# Patient Record
Sex: MALE | Race: WHITE | NOT HISPANIC OR LATINO | URBAN - METROPOLITAN AREA
[De-identification: names, ages, dates, MRNs, and addresses within clinical notes are randomized per-mention and may not be internally consistent; named-entity substitution may affect disease eponyms.]

---
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
## 2021-04-17 NOTE — ED BEHAVIORAL HEALTH ASSESSMENT NOTE - AFFECT RANGE
Patient daughter transferred patient into bathroom when PCA was doing hourly rounding she asked how she got in bathroom and daughter sated she is an occupational therapist and is capable of transferring her mother  PCA asked daughter is she would ring bell when mother was done so staff could assist with her transfer out of bathroom and daughter was agreeable  PCA then went back to check on patient and daughter had transferred patient again back into her chair and was at the sink washing her hands  PCA again asked if daughter transferred patient without calling and daughter stated yes  PCA reported incident to this nurse and made therapist aware who also spoke with her and explained the liability if she or her mother were to get injured during a transfer  Daughter was agreeable to call staff for assistance and not transfer without staff involvement 
Patient's daughter here to do lovenox injection she did very well with no real cueing
Pt is alert and oriented, pleasant and cooperative  RN checked skin on sacrum, very tiny scab present on L buttock, no pink area  Pt repositioned with pillow support onto her R side, hydraguard applied  Pt refused her colace this evening, she did report having 2 BM today  Pt has call bell in reach, she makes needs known 
Pt is alert and oriented, she is pleasant and cooperative with care  Oxy 2 5 mg administered for 6/10 pain in R knee  Left arm splint feel looser per pt, and has 2/10 pain- ortho is aware per MD and should be seeing the pt today or tomorrow  Pt in bed with pillow support, LUE and RLE are elevated  Call bell is in reach 
Pt refused lovenox injection teaching, pt not sure if kirstieer will be able to administer lovenox  Pt is inquiring if some other medication may be used in place of lovenox   RN will inform medical 
Went over appointments and over medications with patient will be going home with her daughter
Constricted

## 2021-10-29 NOTE — ED BEHAVIORAL HEALTH ASSESSMENT NOTE - NS ED [facility] MED ROS HEMATOLOGIC LYMPHATIC
Problem: Falls - Risk of:  Goal: Will remain free from falls  Description: Will remain free from falls  10/29/2021 1743 by Pricilla Sanchez RN  Outcome: Ongoing  10/29/2021 0534 by Kadi Montiel RN  Outcome: Ongoing  Goal: Absence of physical injury  Description: Absence of physical injury  10/29/2021 1743 by Pricilla Snachez RN  Outcome: Ongoing  10/29/2021 0534 by Kadi Montiel RN  Outcome: Ongoing     Problem: Discharge Planning:  Goal: Discharged to appropriate level of care  Description: Discharged to appropriate level of care  10/29/2021 1743 by Pricilla Sanchez RN  Outcome: Ongoing  10/29/2021 0534 by Kadi Montiel RN  Outcome: Ongoing     Problem:  Body Temperature - Imbalanced:  Goal: Ability to maintain a body temperature in the normal range will improve  Description: Ability to maintain a body temperature in the normal range will improve  10/29/2021 1743 by Pricilla Sanchez RN  Outcome: Ongoing  10/29/2021 0534 by Kadi Montiel RN  Outcome: Ongoing     Problem: Mobility - Impaired:  Goal: Mobility will improve to maximum level  Description: Mobility will improve to maximum level  10/29/2021 1743 by Pricilla Sanchez RN  Outcome: Ongoing  10/29/2021 0534 by Kadi Montiel RN  Outcome: Ongoing     Problem: Pain:  Goal: Pain level will decrease  Description: Pain level will decrease  10/29/2021 1743 by Pricilla Sanchez RN  Outcome: Ongoing  10/29/2021 0534 by Kadi Motniel RN  Outcome: Ongoing  Goal: Control of acute pain  Description: Control of acute pain  10/29/2021 1743 by Pricilla Sanchez RN  Outcome: Ongoing  10/29/2021 0534 by Kadi Montiel RN  Outcome: Ongoing  Goal: Control of chronic pain  Description: Control of chronic pain  10/29/2021 1743 by Pricilla Sanchez RN  Outcome: Ongoing  10/29/2021 0534 by Kadi Montiel RN  Outcome: Ongoing     Problem: Skin Integrity - Impaired:  Goal: Will show no infection signs and symptoms  Description: Will show no infection signs and symptoms  10/29/2021 1743 by Lindsey Rubi RN  Outcome: Ongoing  10/29/2021 0534 by Felipe Espinoza RN  Outcome: Ongoing  Goal: Absence of new skin breakdown  Description: Absence of new skin breakdown  10/29/2021 1743 by Lindsey Rubi RN  Outcome: Ongoing  10/29/2021 0534 by Felipe Espinoza RN  Outcome: Ongoing     Problem: Serum Glucose Level - Abnormal:  Goal: Ability to maintain appropriate glucose levels will improve  Description: Ability to maintain appropriate glucose levels will improve  10/29/2021 1743 by Lindsey Rubi RN  Outcome: Ongoing  10/29/2021 0534 by Felipe Espinoza RN  Outcome: Ongoing     Problem: Sensory Perception - Impaired:  Goal: Ability to maintain a stable neurologic state will improve  Description: Ability to maintain a stable neurologic state will improve  10/29/2021 1743 by Lindsey Rubi RN  Outcome: Ongoing  10/29/2021 0534 by Felipe Espinoza RN  Outcome: Ongoing     Problem:  Activity:  Goal: Ability to tolerate increased activity will improve  Description: Ability to tolerate increased activity will improve  10/29/2021 1743 by Lindsey Rubi RN  Outcome: Ongoing  10/29/2021 0534 by Felipe Espinoza RN  Outcome: Ongoing  Goal: Risk for activity intolerance will decrease  Description: Risk for activity intolerance will decrease  10/29/2021 1743 by Lindsey Rubi RN  Outcome: Ongoing  10/29/2021 0534 by Felipe Espinoza RN  Outcome: Ongoing  Goal: Expression of feelings of increased energy will increase  Description: Expression of feelings of increased energy will increase  10/29/2021 1743 by Lindsey Rubi RN  Outcome: Ongoing  10/29/2021 0534 by Felipe Espinoza RN  Outcome: Ongoing  Goal: Amount of time patient spends in regular exercise will increase  10/29/2021 1743 by Lindsey Rubi RN  Outcome: Ongoing  10/29/2021 0534 by Felipe Espinoza RN  Outcome: Ongoing     Problem: Cardiac:  Goal: Hemodynamic stability will improve  Description: Hemodynamic stability will improve  10/29/2021 1743 by Sid Beth RN  Outcome: Ongoing  10/29/2021 0534 by Kuldip Gutierrez RN  Outcome: Ongoing  Goal: Complications related to the disease process, condition or treatment will be avoided or minimized  Description: Complications related to the disease process, condition or treatment will be avoided or minimized  10/29/2021 1743 by Sid Beth RN  Outcome: Ongoing  10/29/2021 0534 by Kuldip Gutierrez RN  Outcome: Ongoing  Goal: Cerebral tissue perfusion will improve  Description: Cerebral tissue perfusion will improve  10/29/2021 1743 by Sid Beth RN  Outcome: Ongoing  10/29/2021 0534 by Kuldip Gutierrez RN  Outcome: Ongoing  Goal: Ability to maintain an adequate cardiac output will improve  Description: Ability to maintain an adequate cardiac output will improve  10/29/2021 1743 by Sid Beth RN  Outcome: Ongoing  10/29/2021 0534 by Kuldip Gutierrez RN  Outcome: Ongoing     Problem: Coping:  Goal: Ability to identify and develop effective coping behavior will improve  Description: Ability to identify and develop effective coping behavior will improve  10/29/2021 1743 by Sid Beth RN  Outcome: Ongoing  10/29/2021 0534 by Kuldip Gutierrez RN  Outcome: Ongoing  Goal: Ability to adjust to condition or change in health will improve  Description: Ability to adjust to condition or change in health will improve  10/29/2021 1743 by Sid Beth RN  Outcome: Ongoing  10/29/2021 0534 by Kuldip Gutierrez RN  Outcome: Ongoing  Goal: Level of anxiety will decrease  Description: Level of anxiety will decrease  10/29/2021 1743 by Sid Beth RN  Outcome: Ongoing  10/29/2021 0534 by Kuldip Gutierrez RN  Outcome: Ongoing  Goal: General experience of comfort will improve  10/29/2021 1743 by Sid Beth RN  Outcome: Ongoing  10/29/2021 0534 by Kuldip Gutierrez RN  Outcome: Ongoing     Problem: Health Behavior:  Goal: Identification of resources available to assist in meeting health care needs will improve  Description: Identification of resources available to assist in meeting health care needs will improve  10/29/2021 1743 by Keegan Arambula RN  Outcome: Ongoing  10/29/2021 0534 by Ari Solorzano RN  Outcome: Ongoing  Goal: Ability to identify and utilize available resources and services will improve  Description: Ability to identify and utilize available resources and services will improve  10/29/2021 1743 by Keegan Arambula RN  Outcome: Ongoing  10/29/2021 0534 by Ari Solorzano RN  Outcome: Ongoing  Goal: Ability to manage health-related needs will improve  Description: Ability to manage health-related needs will improve  10/29/2021 1743 by Keegan Arambula RN  Outcome: Ongoing  10/29/2021 0534 by Ari Solorzano RN  Outcome: Ongoing     Problem: Nutritional:  Goal: Ability to identify appropriate dietary choices will improve  Description: Ability to identify appropriate dietary choices will improve  10/29/2021 1743 by Keegan Arambula RN  Outcome: Ongoing  10/29/2021 0534 by Ari Solorzano RN  Outcome: Ongoing  Goal: Maintenance of adequate nutrition will improve  Description: Maintenance of adequate nutrition will improve  10/29/2021 1743 by Keegan Arambula RN  Outcome: Ongoing  10/29/2021 0534 by Ari Solorzano RN  Outcome: Ongoing  Goal: Progress toward achieving an optimal weight will improve  Description: Progress toward achieving an optimal weight will improve  10/29/2021 1743 by Keegan Arambula RN  Outcome: Ongoing  10/29/2021 0534 by Ari Solorzano RN  Outcome: Ongoing  Goal: Adjustment of eating patterns to appropriate times will improve  Description: Adjustment of eating patterns to appropriate times will improve  10/29/2021 1743 by Keegan Arambula RN  Outcome: Ongoing  10/29/2021 0534 by Ari Solorzano RN  Outcome: Ongoing  Goal: Ability to make healthy dietary choices will improve  Description: Ability to make healthy dietary choices will improve  10/29/2021 [id number] by Celine Lazar RN  Outcome: Ongoing  10/29/2021 0534 by Sarahi Rae RN  Outcome: Ongoing  Goal: Nutritional status will improve  10/29/2021 1743 by Celine Lazar RN  Outcome: Ongoing  10/29/2021 0534 by Sarahi Rae RN  Outcome: Ongoing     Problem: ABCDS Injury Assessment  Goal: Absence of physical injury  10/29/2021 1743 by Celine Lazar RN  Outcome: Ongoing  10/29/2021 0534 by Sarahi Rae RN  Outcome: Ongoing     Problem: Fluid Volume:  Goal: Ability to maintain a balanced intake and output will improve  Description: Ability to maintain a balanced intake and output will improve  10/29/2021 1743 by Celine Lazar RN  Outcome: Ongoing  10/29/2021 0534 by Sarahi Rae RN  Outcome: Ongoing     Problem: Metabolic:  Goal: Ability to maintain appropriate glucose levels will improve  Description: Ability to maintain appropriate glucose levels will improve  10/29/2021 1743 by Celine Lazar RN  Outcome: Ongoing  10/29/2021 0534 by Sarahi Rae RN  Outcome: Ongoing     Problem: Physical Regulation:  Goal: Complications related to the disease process, condition or treatment will be avoided or minimized  Description: Complications related to the disease process, condition or treatment will be avoided or minimized  10/29/2021 1743 by Celine Lazar RN  Outcome: Ongoing  10/29/2021 0534 by Sarahi Rae RN  Outcome: Ongoing  Goal: Diagnostic test results will improve  Description: Diagnostic test results will improve  10/29/2021 1743 by Celine Lazar RN  Outcome: Ongoing  10/29/2021 0534 by Sarahi Rae RN  Outcome: Ongoing  Goal: Prevent transmision of infection  Description: Prevent transmision of infection  10/29/2021 1743 by Celine Lazar RN  Outcome: Ongoing  10/29/2021 0534 by Sarahi Rae RN  Outcome: Ongoing  Goal: Will remain free from infection  Description: Will remain free from infection  10/29/2021 1743 by Celine Lazar RN  Outcome: Ongoing  10/29/2021 0534 by Hermes Mi RN  Outcome: Ongoing  Goal: Ability to maintain vital signs within normal range will improve  Description: Ability to maintain vital signs within normal range will improve  10/29/2021 1743 by Parminder Nugent RN  Outcome: Ongoing  10/29/2021 0534 by Hermes Mi RN  Outcome: Ongoing     Problem: Skin Integrity:  Goal: Risk for impaired skin integrity will decrease  Description: Risk for impaired skin integrity will decrease  10/29/2021 1743 by Parminder Nugent RN  Outcome: Ongoing  10/29/2021 0534 by Hermes Mi RN  Outcome: Ongoing  Goal: Demonstration of wound healing without infection will improve  Description: Demonstration of wound healing without infection will improve  10/29/2021 1743 by Parminder Nugent RN  Outcome: Ongoing  10/29/2021 0534 by Hermes Mi RN  Outcome: Ongoing  Goal: Complications related to intravenous access or infusion will be avoided or minimized  Description: Complications related to intravenous access or infusion will be avoided or minimized  10/29/2021 1743 by Parminder Nugent RN  Outcome: Ongoing  10/29/2021 0534 by Hermes Mi RN  Outcome: Ongoing     Problem: Tissue Perfusion:  Goal: Adequacy of tissue perfusion will improve  Description: Adequacy of tissue perfusion will improve  10/29/2021 1743 by Parminder Nugent RN  Outcome: Ongoing  10/29/2021 0534 by Hermes Mi RN  Outcome: Ongoing     Problem: Safety:  Goal: Ability to remain free from injury will improve  Description: Ability to remain free from injury will improve  Outcome: Ongoing  Goal: Will show no signs and symptoms of excessive bleeding  Description: Will show no signs and symptoms of excessive bleeding  Outcome: Ongoing     Problem: Infection - Methicillin-Resistant Staphylococcus Aureus Infection:  Goal: Absence of methicillin-resistant Staphylococcus aureus infection  Description: Absence of methicillin-resistant Staphylococcus aureus infection  Outcome: Ongoing     Problem: Self-Concept:  Goal: Ability to verbalize positive feelings about self will improve  Description: Ability to verbalize positive feelings about self will improve  Outcome: Ongoing     Problem: Respiratory:  Goal: Ability to maintain normal respiratory secretions will improve  Description: Ability to maintain normal respiratory secretions will improve  Outcome: Ongoing     Problem: SAFETY  Goal: Free from accidental physical injury  Outcome: Ongoing  Goal: Free from intentional harm  Outcome: Ongoing     Problem: DAILY CARE  Goal: Daily care needs are met  Outcome: Ongoing     Problem: PAIN  Goal: Patient's pain/discomfort is manageable  Outcome: Ongoing     Problem: SKIN INTEGRITY  Goal: Skin integrity is maintained or improved  Outcome: Ongoing     Problem: KNOWLEDGE DEFICIT  Goal: Patient/S.O. demonstrates understanding of disease process, treatment plan, medications, and discharge instructions.   Outcome: Ongoing     Problem: DISCHARGE BARRIERS  Goal: Patient's continuum of care needs are met  Outcome: Ongoing     Problem: Bleeding:  Goal: Will show no signs and symptoms of excessive bleeding  Description: Will show no signs and symptoms of excessive bleeding  Outcome: Ongoing No complaints

## 2022-06-04 ENCOUNTER — EMERGENCY (EMERGENCY)
Facility: HOSPITAL | Age: 22
LOS: 0 days | Discharge: HOME | End: 2022-06-04
Attending: FAMILY MEDICINE
Payer: COMMERCIAL

## 2022-06-04 VITALS
HEIGHT: 72 IN | DIASTOLIC BLOOD PRESSURE: 94 MMHG | OXYGEN SATURATION: 100 % | WEIGHT: 160.06 LBS | SYSTOLIC BLOOD PRESSURE: 145 MMHG | TEMPERATURE: 98 F | RESPIRATION RATE: 19 BRPM | HEART RATE: 88 BPM

## 2022-06-04 VITALS
OXYGEN SATURATION: 100 % | SYSTOLIC BLOOD PRESSURE: 152 MMHG | HEART RATE: 58 BPM | RESPIRATION RATE: 17 BRPM | TEMPERATURE: 98 F | DIASTOLIC BLOOD PRESSURE: 96 MMHG

## 2022-06-04 DIAGNOSIS — F41.9 ANXIETY DISORDER, UNSPECIFIED: ICD-10-CM

## 2022-06-04 DIAGNOSIS — K20.90 ESOPHAGITIS, UNSPECIFIED WITHOUT BLEEDING: ICD-10-CM

## 2022-06-04 DIAGNOSIS — E87.6 HYPOKALEMIA: ICD-10-CM

## 2022-06-04 DIAGNOSIS — R11.2 NAUSEA WITH VOMITING, UNSPECIFIED: ICD-10-CM

## 2022-06-04 DIAGNOSIS — R00.1 BRADYCARDIA, UNSPECIFIED: ICD-10-CM

## 2022-06-04 DIAGNOSIS — Z88.1 ALLERGY STATUS TO OTHER ANTIBIOTIC AGENTS STATUS: ICD-10-CM

## 2022-06-04 DIAGNOSIS — F32.A DEPRESSION, UNSPECIFIED: ICD-10-CM

## 2022-06-04 DIAGNOSIS — K59.00 CONSTIPATION, UNSPECIFIED: ICD-10-CM

## 2022-06-04 LAB
ADD ON TEST-SPECIMEN IN LAB: SIGNIFICANT CHANGE UP
ALBUMIN SERPL ELPH-MCNC: 4.6 G/DL — SIGNIFICANT CHANGE UP (ref 3.3–5)
ALP SERPL-CCNC: 86 U/L — SIGNIFICANT CHANGE UP (ref 40–120)
ALT FLD-CCNC: 26 U/L — SIGNIFICANT CHANGE UP (ref 12–78)
ANION GAP SERPL CALC-SCNC: 9 MMOL/L — SIGNIFICANT CHANGE UP (ref 5–17)
APPEARANCE UR: CLEAR — SIGNIFICANT CHANGE UP
APTT BLD: 29.3 SEC — SIGNIFICANT CHANGE UP (ref 27.5–35.5)
AST SERPL-CCNC: 14 U/L — LOW (ref 15–37)
BASOPHILS # BLD AUTO: 0.04 K/UL — SIGNIFICANT CHANGE UP (ref 0–0.2)
BASOPHILS NFR BLD AUTO: 0.5 % — SIGNIFICANT CHANGE UP (ref 0–2)
BILIRUB SERPL-MCNC: 1.8 MG/DL — HIGH (ref 0.2–1.2)
BILIRUB UR-MCNC: NEGATIVE — SIGNIFICANT CHANGE UP
BUN SERPL-MCNC: 29 MG/DL — HIGH (ref 7–23)
CALCIUM SERPL-MCNC: 10.5 MG/DL — HIGH (ref 8.5–10.1)
CHLORIDE SERPL-SCNC: 101 MMOL/L — SIGNIFICANT CHANGE UP (ref 96–108)
CO2 SERPL-SCNC: 25 MMOL/L — SIGNIFICANT CHANGE UP (ref 22–31)
COLOR SPEC: YELLOW — SIGNIFICANT CHANGE UP
CREAT SERPL-MCNC: 1.15 MG/DL — SIGNIFICANT CHANGE UP (ref 0.5–1.3)
DIFF PNL FLD: NEGATIVE — SIGNIFICANT CHANGE UP
EGFR: 93 ML/MIN/1.73M2 — SIGNIFICANT CHANGE UP
EOSINOPHIL # BLD AUTO: 0.05 K/UL — SIGNIFICANT CHANGE UP (ref 0–0.5)
EOSINOPHIL NFR BLD AUTO: 0.6 % — SIGNIFICANT CHANGE UP (ref 0–6)
GLUCOSE SERPL-MCNC: 125 MG/DL — HIGH (ref 70–99)
GLUCOSE UR QL: NEGATIVE — SIGNIFICANT CHANGE UP
HCT VFR BLD CALC: 48.4 % — SIGNIFICANT CHANGE UP (ref 39–50)
HGB BLD-MCNC: 17 G/DL — SIGNIFICANT CHANGE UP (ref 13–17)
IMM GRANULOCYTES NFR BLD AUTO: 0.4 % — SIGNIFICANT CHANGE UP (ref 0–1.5)
INR BLD: 1.22 RATIO — HIGH (ref 0.88–1.16)
KETONES UR-MCNC: ABNORMAL
LACTATE SERPL-SCNC: 2.1 MMOL/L — HIGH (ref 0.7–2)
LEUKOCYTE ESTERASE UR-ACNC: NEGATIVE — SIGNIFICANT CHANGE UP
LIDOCAIN IGE QN: 125 U/L — SIGNIFICANT CHANGE UP (ref 73–393)
LYMPHOCYTES # BLD AUTO: 1.67 K/UL — SIGNIFICANT CHANGE UP (ref 1–3.3)
LYMPHOCYTES # BLD AUTO: 21.1 % — SIGNIFICANT CHANGE UP (ref 13–44)
MCHC RBC-ENTMCNC: 30 PG — SIGNIFICANT CHANGE UP (ref 27–34)
MCHC RBC-ENTMCNC: 35.1 GM/DL — SIGNIFICANT CHANGE UP (ref 32–36)
MCV RBC AUTO: 85.5 FL — SIGNIFICANT CHANGE UP (ref 80–100)
MONOCYTES # BLD AUTO: 0.94 K/UL — HIGH (ref 0–0.9)
MONOCYTES NFR BLD AUTO: 11.9 % — SIGNIFICANT CHANGE UP (ref 2–14)
NEUTROPHILS # BLD AUTO: 5.2 K/UL — SIGNIFICANT CHANGE UP (ref 1.8–7.4)
NEUTROPHILS NFR BLD AUTO: 65.5 % — SIGNIFICANT CHANGE UP (ref 43–77)
NITRITE UR-MCNC: NEGATIVE — SIGNIFICANT CHANGE UP
PH UR: 7 — SIGNIFICANT CHANGE UP (ref 5–8)
PLATELET # BLD AUTO: 347 K/UL — SIGNIFICANT CHANGE UP (ref 150–400)
POTASSIUM SERPL-MCNC: 2.8 MMOL/L — CRITICAL LOW (ref 3.5–5.3)
POTASSIUM SERPL-SCNC: 2.8 MMOL/L — CRITICAL LOW (ref 3.5–5.3)
PROT SERPL-MCNC: 8.2 GM/DL — SIGNIFICANT CHANGE UP (ref 6–8.3)
PROT UR-MCNC: NEGATIVE — SIGNIFICANT CHANGE UP
PROTHROM AB SERPL-ACNC: 14.2 SEC — HIGH (ref 10.5–13.4)
RAPID RVP RESULT: SIGNIFICANT CHANGE UP
RBC # BLD: 5.66 M/UL — SIGNIFICANT CHANGE UP (ref 4.2–5.8)
RBC # FLD: 12.3 % — SIGNIFICANT CHANGE UP (ref 10.3–14.5)
SARS-COV-2 RNA SPEC QL NAA+PROBE: SIGNIFICANT CHANGE UP
SODIUM SERPL-SCNC: 135 MMOL/L — SIGNIFICANT CHANGE UP (ref 135–145)
SP GR SPEC: 1 — LOW (ref 1.01–1.02)
UROBILINOGEN FLD QL: 1
WBC # BLD: 7.93 K/UL — SIGNIFICANT CHANGE UP (ref 3.8–10.5)
WBC # FLD AUTO: 7.93 K/UL — SIGNIFICANT CHANGE UP (ref 3.8–10.5)

## 2022-06-04 PROCEDURE — 85610 PROTHROMBIN TIME: CPT

## 2022-06-04 PROCEDURE — C9113: CPT

## 2022-06-04 PROCEDURE — 93010 ELECTROCARDIOGRAM REPORT: CPT | Mod: 59

## 2022-06-04 PROCEDURE — 83735 ASSAY OF MAGNESIUM: CPT

## 2022-06-04 PROCEDURE — 85730 THROMBOPLASTIN TIME PARTIAL: CPT

## 2022-06-04 PROCEDURE — 86850 RBC ANTIBODY SCREEN: CPT

## 2022-06-04 PROCEDURE — 96375 TX/PRO/DX INJ NEW DRUG ADDON: CPT

## 2022-06-04 PROCEDURE — 81003 URINALYSIS AUTO W/O SCOPE: CPT

## 2022-06-04 PROCEDURE — 0225U NFCT DS DNA&RNA 21 SARSCOV2: CPT

## 2022-06-04 PROCEDURE — 86901 BLOOD TYPING SEROLOGIC RH(D): CPT

## 2022-06-04 PROCEDURE — 99284 EMERGENCY DEPT VISIT MOD MDM: CPT

## 2022-06-04 PROCEDURE — 93005 ELECTROCARDIOGRAM TRACING: CPT

## 2022-06-04 PROCEDURE — 74177 CT ABD & PELVIS W/CONTRAST: CPT | Mod: 26,MA

## 2022-06-04 PROCEDURE — 99285 EMERGENCY DEPT VISIT HI MDM: CPT | Mod: 25

## 2022-06-04 PROCEDURE — 74177 CT ABD & PELVIS W/CONTRAST: CPT | Mod: MA

## 2022-06-04 PROCEDURE — 83605 ASSAY OF LACTIC ACID: CPT

## 2022-06-04 PROCEDURE — 99285 EMERGENCY DEPT VISIT HI MDM: CPT

## 2022-06-04 PROCEDURE — 36415 COLL VENOUS BLD VENIPUNCTURE: CPT

## 2022-06-04 PROCEDURE — 96376 TX/PRO/DX INJ SAME DRUG ADON: CPT

## 2022-06-04 PROCEDURE — 96374 THER/PROPH/DIAG INJ IV PUSH: CPT

## 2022-06-04 PROCEDURE — 85025 COMPLETE CBC W/AUTO DIFF WBC: CPT

## 2022-06-04 PROCEDURE — 83690 ASSAY OF LIPASE: CPT

## 2022-06-04 PROCEDURE — 80053 COMPREHEN METABOLIC PANEL: CPT

## 2022-06-04 PROCEDURE — 86900 BLOOD TYPING SEROLOGIC ABO: CPT

## 2022-06-04 RX ORDER — PANTOPRAZOLE SODIUM 20 MG/1
1 TABLET, DELAYED RELEASE ORAL
Qty: 14 | Refills: 0
Start: 2022-06-04 | End: 2022-06-17

## 2022-06-04 RX ORDER — POTASSIUM CHLORIDE 20 MEQ
10 PACKET (EA) ORAL
Refills: 0 | Status: COMPLETED | OUTPATIENT
Start: 2022-06-04 | End: 2022-06-04

## 2022-06-04 RX ORDER — PANTOPRAZOLE SODIUM 20 MG/1
40 TABLET, DELAYED RELEASE ORAL ONCE
Refills: 0 | Status: COMPLETED | OUTPATIENT
Start: 2022-06-04 | End: 2022-06-04

## 2022-06-04 RX ORDER — SODIUM CHLORIDE 9 MG/ML
1000 INJECTION INTRAMUSCULAR; INTRAVENOUS; SUBCUTANEOUS ONCE
Refills: 0 | Status: COMPLETED | OUTPATIENT
Start: 2022-06-04 | End: 2022-06-04

## 2022-06-04 RX ORDER — ONDANSETRON 8 MG/1
4 TABLET, FILM COATED ORAL ONCE
Refills: 0 | Status: COMPLETED | OUTPATIENT
Start: 2022-06-04 | End: 2022-06-04

## 2022-06-04 RX ORDER — ONDANSETRON 8 MG/1
1 TABLET, FILM COATED ORAL
Qty: 15 | Refills: 0
Start: 2022-06-04 | End: 2022-06-08

## 2022-06-04 RX ADMIN — SODIUM CHLORIDE 1000 MILLILITER(S): 9 INJECTION INTRAMUSCULAR; INTRAVENOUS; SUBCUTANEOUS at 01:59

## 2022-06-04 RX ADMIN — ONDANSETRON 4 MILLIGRAM(S): 8 TABLET, FILM COATED ORAL at 01:08

## 2022-06-04 RX ADMIN — ONDANSETRON 4 MILLIGRAM(S): 8 TABLET, FILM COATED ORAL at 04:34

## 2022-06-04 RX ADMIN — PANTOPRAZOLE SODIUM 40 MILLIGRAM(S): 20 TABLET, DELAYED RELEASE ORAL at 03:07

## 2022-06-04 RX ADMIN — SODIUM CHLORIDE 1000 MILLILITER(S): 9 INJECTION INTRAMUSCULAR; INTRAVENOUS; SUBCUTANEOUS at 01:05

## 2022-06-04 RX ADMIN — Medication 100 MILLIEQUIVALENT(S): at 03:09

## 2022-06-04 RX ADMIN — Medication 100 MILLIEQUIVALENT(S): at 04:08

## 2022-06-04 RX ADMIN — Medication 100 MILLIEQUIVALENT(S): at 02:01

## 2022-06-04 NOTE — ED PROVIDER NOTE - CARE PROVIDER_API CALL
Gisela Waldron  GASTROENTEROLOGY  [address]  [place] [address]  Phone: [phone]  Fax: [phone]  Follow Up Time:

## 2022-06-04 NOTE — ED PROVIDER NOTE - PATIENT PORTAL LINK FT
You can access the FollowMyHealth Patient Portal offered by [facility] by registering at the following website: [email or web address]. By joining [facility]’s FollowMyHealth portal, you will also be able to view your health information using other applications (apps) compatible with our system.

## 2022-06-04 NOTE — ED PROVIDER NOTE - CARE PLAN
1 Principal Discharge DX:	Vomiting  Secondary Diagnosis:	Constipation  Secondary Diagnosis:	Hypokalemia   Principal Discharge DX:	Esophagitis  Secondary Diagnosis:	Constipation  Secondary Diagnosis:	Hypokalemia

## 2022-06-04 NOTE — ED ADULT NURSE NOTE - OBJECTIVE STATEMENT
Pt presents to the ED with N/V x 5 days. States hx of ED, acid reflux, anxiety. Takes Prozac daily. Endorsing anxiety at this time, believes it is making symptoms worse. AAOx3. Respirations even and unlabored, NAD. Skin warm, dry, color appropriate. No active vomiting.

## 2022-06-04 NOTE — ED PROVIDER NOTE - CLINICAL SUMMARY MEDICAL DECISION MAKING FREE TEXT BOX
Pt with hx anxiety, depression and eating disorder, here with vomiting for six days with no bm. No fever. Labs, ivf, ct abd pelvis zofran.

## 2022-06-04 NOTE — ED PROVIDER NOTE - NSFOLLOWUPINSTRUCTIONS_ED_ALL_ED_FT
Take protonix 40mg daily. Avoid NSAIDS, caffeine, spicy foods, tomato sauce. Avoid late meals Follow up with GI Dr. Waldron.    Esophagitis       Esophagitis is inflammation of the esophagus. The esophagus is the tube that carries food from the mouth to the stomach. Esophagitis can cause soreness or pain in the esophagus. This condition can make it difficult and painful to swallow.      What are the causes?    Most causes of esophagitis are not serious. Common causes of this condition include:  •Gastroesophageal reflux disease (GERD). This is when stomach contents move back up into the esophagus (reflux).      •Repeated vomiting.      •An allergic reaction, especially caused by food allergies (eosinophilic esophagitis).      •Injury to the esophagus by swallowing large pills with or without water, or swallowing certain types of medicines.      •Swallowing harmful chemicals, such as household cleaning products.      •Drinking a lot of alcohol.      •An infection of the esophagus. This most often occurs in people who have a weakened immune system.      •Radiation or chemotherapy treatment for cancer.      •Certain diseases such as sarcoidosis, Crohn's disease, and scleroderma.        What are the signs or symptoms?    Symptoms of this condition include:  •Difficult or painful swallowing.      •Pain with swallowing acidic liquids, such as citrus juices. You may also have pain when you burp.      •Chest pain and difficulty breathing.      •Nausea and vomiting.      •Pain in the abdomen.      •Weight loss.      •Ulcers in the mouth and white patches in the mouth (candidiasis).      •Fever.      •Coughing up blood or vomiting blood.      •Stool that is black, tarry, or bright red.        How is this diagnosed?    This condition may be diagnosed based on your medical history and a physical exam. You may also have other tests, including:  •A test to examine your esophagus and stomach with a small flexible tube with a camera (endoscopy).      •A test that measures the acidity level in your esophagus.      •A test that measures how much pressure is on your esophagus.      •A barium swallow or modified barium swallow to show the shape, size, and functioning of your esophagus.      •Allergy tests.        How is this treated?    Treatment for this condition depends on the cause of your esophagitis. In some cases, steroids or other medicines may be given to help relieve your symptoms or to treat the underlying cause of your condition. You may have to make some lifestyle changes, such as:  •Avoiding alcohol.      •Quitting any products that contain nicotine or tobacco. These products include cigarettes, chewing tobacco, and vaping devices, such as e-cigarettes. If you need help quitting, ask your health care provider.      •Changing your diet.      •Exercising.      •Changing your sleep habits and your sleep environment.        Follow these instructions at home:    Medicines     •Take over-the-counter and prescription medicines only as told by your health care provider.       • Do not take aspirin, ibuprofen, or other NSAIDs unless your health care provider told you to do so.    •If you have trouble taking pills:  •Use a pill splitter to decrease the size of the pill. This will decrease the chance of the pill getting stuck or injuring your esophagus.       •Drink water after you take a pill.          Eating and drinking      •Avoid foods and drinks that seem to make your symptoms worse.    •Follow a diet as recommended by your health care provider. This may involve avoiding foods and drinks such as:  •Coffee and tea, with or without caffeine.      •Drinks that contain alcohol.      •Energy drinks and sports drinks.      •Carbonated drinks or sodas.      •Chocolate and cocoa.      •Peppermint and mint flavorings.      •Garlic and onions.      •Horseradish.      •Spicy and acidic foods, including peppers, chili powder, galarza powder, vinegar, hot sauces, and barbecue sauce.      •Citrus fruit juices and citrus fruits, such as oranges, wilber, and limes.      •Tomato-based foods, such as red sauce, chili, salsa, and pizza with red sauce.      •Fried and fatty foods, such as donuts, french fries, potato chips, and high-fat dressings.      •High-fat meats, such as hot dogs and fatty cuts of red and white meats, such as rib eye steak, sausage, ham, and felix.      •High-fat dairy items, such as whole milk, butter, and cream cheese.        Lifestyle     •Eat small, frequent meals instead of large meals.      •Avoid drinking large amounts of liquid with your meals.      •Avoid eating meals during the 2–3 hours before bedtime.      •Avoid lying down right after you eat.      • Do not exercise right after you eat.      • Do not use any products that contain nicotine or tobacco. These products include cigarettes, chewing tobacco, and vaping devices, such as e-cigarettes. If you need help quitting, ask your health care provider.        General instructions      •Pay attention to any changes in your symptoms. Let your health care provider know about them.      •Wear loose-fitting clothing. Do not wear anything tight around your waist that causes pressure on your abdomen.      •Raise (elevate) the head of your bed about 6 inches (15 cm). You may need to use a wedge to do this.      •Try relaxation strategies such as yoga, deep breathing, or meditation to manage stress. If you need help reducing stress, ask your health care provider.      •If you are overweight, reduce your weight to an amount that is healthy for you. Ask your health care provider for guidance about a safe weight loss goal.      •Keep all follow-up visits. This is important.        Contact a health care provider if:    •You have new symptoms.      •You have unexplained weight loss.      •You have difficulty swallowing, or it hurts to swallow.      •You have wheezing or a cough that does not go away.      •Your symptoms do not improve with treatment.      •You have frequent heartburn for more than two weeks.        Get help right away if:    •You have sudden severe pain in your arms, neck, jaw, teeth, or back.      •You suddenly feel sweaty, dizzy, or light-headed.      •You have chest pain or shortness of breath.      •You vomit and the vomit is green, yellow, or black, or it looks like blood or coffee grounds.      •Your stool is red, bloody, or black.      •You have a fever.      •You cannot swallow, drink, or eat.      These symptoms may represent a serious problem that is an emergency. Do not wait to see if the symptoms will go away. Get medical help right away. Call your local emergency services (911 in the [place] Do not drive yourself to the hospital.       Summary    •Esophagitis is inflammation of the esophagus.      •Most causes of esophagitis are not serious.      •Follow your health care provider's instructions about eating and drinking.      •Contact a health care provider if you have new symptoms, have weight loss, or coughing that does not stop.      •Get help right away if you have severe pain in the arms, neck, jaw, teeth, or back, or if you have chest pain, shortness of breath, or fever.      This information is not intended to replace advice given to you by your health care provider. Make sure you discuss any questions you have with your health care provider.      Document Revised: 06/28/2021 Document Reviewed: 06/28/2021 Take protonix 40mg daily. Avoid NSAIDS, caffeine, spicy foods, tomato sauce. Avoid late meals Follow up with GI Dr. Waldron. Take zofran 4mg odt three times daily as needed for nausea.    Esophagitis       Esophagitis is inflammation of the esophagus. The esophagus is the tube that carries food from the mouth to the stomach. Esophagitis can cause soreness or pain in the esophagus. This condition can make it difficult and painful to swallow.      What are the causes?    Most causes of esophagitis are not serious. Common causes of this condition include:  •Gastroesophageal reflux disease (GERD). This is when stomach contents move back up into the esophagus (reflux).      •Repeated vomiting.      •An allergic reaction, especially caused by food allergies (eosinophilic esophagitis).      •Injury to the esophagus by swallowing large pills with or without water, or swallowing certain types of medicines.      •Swallowing harmful chemicals, such as household cleaning products.      •Drinking a lot of alcohol.      •An infection of the esophagus. This most often occurs in people who have a weakened immune system.      •Radiation or chemotherapy treatment for cancer.      •Certain diseases such as sarcoidosis, Crohn's disease, and scleroderma.        What are the signs or symptoms?    Symptoms of this condition include:  •Difficult or painful swallowing.      •Pain with swallowing acidic liquids, such as citrus juices. You may also have pain when you burp.      •Chest pain and difficulty breathing.      •Nausea and vomiting.      •Pain in the abdomen.      •Weight loss.      •Ulcers in the mouth and white patches in the mouth (candidiasis).      •Fever.      •Coughing up blood or vomiting blood.      •Stool that is black, tarry, or bright red.        How is this diagnosed?    This condition may be diagnosed based on your medical history and a physical exam. You may also have other tests, including:  •A test to examine your esophagus and stomach with a small flexible tube with a camera (endoscopy).      •A test that measures the acidity level in your esophagus.      •A test that measures how much pressure is on your esophagus.      •A barium swallow or modified barium swallow to show the shape, size, and functioning of your esophagus.      •Allergy tests.        How is this treated?    Treatment for this condition depends on the cause of your esophagitis. In some cases, steroids or other medicines may be given to help relieve your symptoms or to treat the underlying cause of your condition. You may have to make some lifestyle changes, such as:  •Avoiding alcohol.      •Quitting any products that contain nicotine or tobacco. These products include cigarettes, chewing tobacco, and vaping devices, such as e-cigarettes. If you need help quitting, ask your health care provider.      •Changing your diet.      •Exercising.      •Changing your sleep habits and your sleep environment.        Follow these instructions at home:    Medicines     •Take over-the-counter and prescription medicines only as told by your health care provider.       • Do not take aspirin, ibuprofen, or other NSAIDs unless your health care provider told you to do so.    •If you have trouble taking pills:  •Use a pill splitter to decrease the size of the pill. This will decrease the chance of the pill getting stuck or injuring your esophagus.       •Drink water after you take a pill.          Eating and drinking      •Avoid foods and drinks that seem to make your symptoms worse.    •Follow a diet as recommended by your health care provider. This may involve avoiding foods and drinks such as:  •Coffee and tea, with or without caffeine.      •Drinks that contain alcohol.      •Energy drinks and sports drinks.      •Carbonated drinks or sodas.      •Chocolate and cocoa.      •Peppermint and mint flavorings.      •Garlic and onions.      •Horseradish.      •Spicy and acidic foods, including peppers, chili powder, galarza powder, vinegar, hot sauces, and barbecue sauce.      •Citrus fruit juices and citrus fruits, such as oranges, wilber, and limes.      •Tomato-based foods, such as red sauce, chili, salsa, and pizza with red sauce.      •Fried and fatty foods, such as donuts, french fries, potato chips, and high-fat dressings.      •High-fat meats, such as hot dogs and fatty cuts of red and white meats, such as rib eye steak, sausage, ham, and felix.      •High-fat dairy items, such as whole milk, butter, and cream cheese.        Lifestyle     •Eat small, frequent meals instead of large meals.      •Avoid drinking large amounts of liquid with your meals.      •Avoid eating meals during the 2–3 hours before bedtime.      •Avoid lying down right after you eat.      • Do not exercise right after you eat.      • Do not use any products that contain nicotine or tobacco. These products include cigarettes, chewing tobacco, and vaping devices, such as e-cigarettes. If you need help quitting, ask your health care provider.        General instructions      •Pay attention to any changes in your symptoms. Let your health care provider know about them.      •Wear loose-fitting clothing. Do not wear anything tight around your waist that causes pressure on your abdomen.      •Raise (elevate) the head of your bed about 6 inches (15 cm). You may need to use a wedge to do this.      •Try relaxation strategies such as yoga, deep breathing, or meditation to manage stress. If you need help reducing stress, ask your health care provider.      •If you are overweight, reduce your weight to an amount that is healthy for you. Ask your health care provider for guidance about a safe weight loss goal.      •Keep all follow-up visits. This is important.        Contact a health care provider if:    •You have new symptoms.      •You have unexplained weight loss.      •You have difficulty swallowing, or it hurts to swallow.      •You have wheezing or a cough that does not go away.      •Your symptoms do not improve with treatment.      •You have frequent heartburn for more than two weeks.        Get help right away if:    •You have sudden severe pain in your arms, neck, jaw, teeth, or back.      •You suddenly feel sweaty, dizzy, or light-headed.      •You have chest pain or shortness of breath.      •You vomit and the vomit is green, yellow, or black, or it looks like blood or coffee grounds.      •Your stool is red, bloody, or black.      •You have a fever.      •You cannot swallow, drink, or eat.      These symptoms may represent a serious problem that is an emergency. Do not wait to see if the symptoms will go away. Get medical help right away. Call your local emergency services (911 in the [place] Do not drive yourself to the hospital.       Summary    •Esophagitis is inflammation of the esophagus.      •Most causes of esophagitis are not serious.      •Follow your health care provider's instructions about eating and drinking.      •Contact a health care provider if you have new symptoms, have weight loss, or coughing that does not stop.      •Get help right away if you have severe pain in the arms, neck, jaw, teeth, or back, or if you have chest pain, shortness of breath, or fever.      This information is not intended to replace advice given to you by your health care provider. Make sure you discuss any questions you have with your health care provider.      Document Revised: 06/28/2021 Document Reviewed: 06/28/2021

## 2022-06-04 NOTE — ED PROVIDER NOTE - OBJECTIVE STATEMENT
Pt is a 20 yo wm with hx depression, anxiety eating disorder, who developed nausea and vomiting on Monday after walking dog. Pt states conytinued to vomit since then but did not vomit on Thursday,. no diarrhea. Last bm 6 days ago . No dysuria or fever.. No sick contacts. Smoked mj 10 days ago. No cig or etoh. Had gastritis episode in March where he was seen at [facility].

## 2022-06-04 NOTE — ED PROVIDER NOTE - PROGRESS NOTE DETAILS
informed by rn that pt spat up a small portion of the apple juice for po challenge. Zofran ordered. Will reassess in another half hours. Pending urinalysis. Karen DUNLAP

## 2022-06-05 ENCOUNTER — EMERGENCY (EMERGENCY)
Facility: HOSPITAL | Age: 22
LOS: 0 days | Discharge: HOME | End: 2022-06-06
Attending: EMERGENCY MEDICINE
Payer: COMMERCIAL

## 2022-06-05 VITALS — HEIGHT: 72 IN | WEIGHT: 160.06 LBS

## 2022-06-05 VITALS
RESPIRATION RATE: 18 BRPM | OXYGEN SATURATION: 95 % | DIASTOLIC BLOOD PRESSURE: 105 MMHG | SYSTOLIC BLOOD PRESSURE: 145 MMHG | TEMPERATURE: 98 F | HEART RATE: 66 BPM

## 2022-06-05 DIAGNOSIS — R11.2 NAUSEA WITH VOMITING, UNSPECIFIED: ICD-10-CM

## 2022-06-05 DIAGNOSIS — F41.8 OTHER SPECIFIED ANXIETY DISORDERS: ICD-10-CM

## 2022-06-05 DIAGNOSIS — K29.70 GASTRITIS, UNSPECIFIED, WITHOUT BLEEDING: ICD-10-CM

## 2022-06-05 DIAGNOSIS — Z88.1 ALLERGY STATUS TO OTHER ANTIBIOTIC AGENTS STATUS: ICD-10-CM

## 2022-06-05 LAB
ALBUMIN SERPL ELPH-MCNC: 4.7 G/DL — SIGNIFICANT CHANGE UP (ref 3.3–5)
ALP SERPL-CCNC: 83 U/L — SIGNIFICANT CHANGE UP (ref 40–120)
ALT FLD-CCNC: 25 U/L — SIGNIFICANT CHANGE UP (ref 12–78)
ANION GAP SERPL CALC-SCNC: 9 MMOL/L — SIGNIFICANT CHANGE UP (ref 5–17)
APPEARANCE UR: CLEAR — SIGNIFICANT CHANGE UP
AST SERPL-CCNC: 15 U/L — SIGNIFICANT CHANGE UP (ref 15–37)
BASOPHILS # BLD AUTO: 0.04 K/UL — SIGNIFICANT CHANGE UP (ref 0–0.2)
BASOPHILS NFR BLD AUTO: 0.5 % — SIGNIFICANT CHANGE UP (ref 0–2)
BILIRUB SERPL-MCNC: 1.3 MG/DL — HIGH (ref 0.2–1.2)
BILIRUB UR-MCNC: NEGATIVE — SIGNIFICANT CHANGE UP
BUN SERPL-MCNC: 20 MG/DL — SIGNIFICANT CHANGE UP (ref 7–23)
CALCIUM SERPL-MCNC: 9.9 MG/DL — SIGNIFICANT CHANGE UP (ref 8.5–10.1)
CHLORIDE SERPL-SCNC: 104 MMOL/L — SIGNIFICANT CHANGE UP (ref 96–108)
CO2 SERPL-SCNC: 23 MMOL/L — SIGNIFICANT CHANGE UP (ref 22–31)
COLOR SPEC: YELLOW — SIGNIFICANT CHANGE UP
CREAT SERPL-MCNC: 1.11 MG/DL — SIGNIFICANT CHANGE UP (ref 0.5–1.3)
DIFF PNL FLD: NEGATIVE — SIGNIFICANT CHANGE UP
EGFR: 97 ML/MIN/1.73M2 — SIGNIFICANT CHANGE UP
EOSINOPHIL # BLD AUTO: 0.02 K/UL — SIGNIFICANT CHANGE UP (ref 0–0.5)
EOSINOPHIL NFR BLD AUTO: 0.2 % — SIGNIFICANT CHANGE UP (ref 0–6)
GLUCOSE SERPL-MCNC: 105 MG/DL — HIGH (ref 70–99)
GLUCOSE UR QL: NEGATIVE — SIGNIFICANT CHANGE UP
HCT VFR BLD CALC: 45.7 % — SIGNIFICANT CHANGE UP (ref 39–50)
HGB BLD-MCNC: 16 G/DL — SIGNIFICANT CHANGE UP (ref 13–17)
IMM GRANULOCYTES NFR BLD AUTO: 0.2 % — SIGNIFICANT CHANGE UP (ref 0–1.5)
KETONES UR-MCNC: ABNORMAL
LEUKOCYTE ESTERASE UR-ACNC: NEGATIVE — SIGNIFICANT CHANGE UP
LIDOCAIN IGE QN: 124 U/L — SIGNIFICANT CHANGE UP (ref 73–393)
LYMPHOCYTES # BLD AUTO: 0.87 K/UL — LOW (ref 1–3.3)
LYMPHOCYTES # BLD AUTO: 10.8 % — LOW (ref 13–44)
MCHC RBC-ENTMCNC: 30.2 PG — SIGNIFICANT CHANGE UP (ref 27–34)
MCHC RBC-ENTMCNC: 35 GM/DL — SIGNIFICANT CHANGE UP (ref 32–36)
MCV RBC AUTO: 86.4 FL — SIGNIFICANT CHANGE UP (ref 80–100)
MONOCYTES # BLD AUTO: 0.45 K/UL — SIGNIFICANT CHANGE UP (ref 0–0.9)
MONOCYTES NFR BLD AUTO: 5.6 % — SIGNIFICANT CHANGE UP (ref 2–14)
NEUTROPHILS # BLD AUTO: 6.66 K/UL — SIGNIFICANT CHANGE UP (ref 1.8–7.4)
NEUTROPHILS NFR BLD AUTO: 82.7 % — HIGH (ref 43–77)
NITRITE UR-MCNC: NEGATIVE — SIGNIFICANT CHANGE UP
PCP SPEC-MCNC: SIGNIFICANT CHANGE UP
PH UR: 8 — SIGNIFICANT CHANGE UP (ref 5–8)
PLATELET # BLD AUTO: 336 K/UL — SIGNIFICANT CHANGE UP (ref 150–400)
POTASSIUM SERPL-MCNC: 3.3 MMOL/L — LOW (ref 3.5–5.3)
POTASSIUM SERPL-SCNC: 3.3 MMOL/L — LOW (ref 3.5–5.3)
PROT SERPL-MCNC: 8.1 GM/DL — SIGNIFICANT CHANGE UP (ref 6–8.3)
PROT UR-MCNC: NEGATIVE — SIGNIFICANT CHANGE UP
RBC # BLD: 5.29 M/UL — SIGNIFICANT CHANGE UP (ref 4.2–5.8)
RBC # FLD: 12 % — SIGNIFICANT CHANGE UP (ref 10.3–14.5)
SODIUM SERPL-SCNC: 136 MMOL/L — SIGNIFICANT CHANGE UP (ref 135–145)
SP GR SPEC: 1.02 — SIGNIFICANT CHANGE UP (ref 1.01–1.02)
UROBILINOGEN FLD QL: 1
WBC # BLD: 8.06 K/UL — SIGNIFICANT CHANGE UP (ref 3.8–10.5)
WBC # FLD AUTO: 8.06 K/UL — SIGNIFICANT CHANGE UP (ref 3.8–10.5)

## 2022-06-05 PROCEDURE — 99284 EMERGENCY DEPT VISIT MOD MDM: CPT

## 2022-06-05 PROCEDURE — 83690 ASSAY OF LIPASE: CPT

## 2022-06-05 PROCEDURE — 96374 THER/PROPH/DIAG INJ IV PUSH: CPT

## 2022-06-05 PROCEDURE — 99284 EMERGENCY DEPT VISIT MOD MDM: CPT | Mod: 25

## 2022-06-05 PROCEDURE — 80307 DRUG TEST PRSMV CHEM ANLYZR: CPT

## 2022-06-05 PROCEDURE — 81003 URINALYSIS AUTO W/O SCOPE: CPT

## 2022-06-05 PROCEDURE — 85025 COMPLETE CBC W/AUTO DIFF WBC: CPT

## 2022-06-05 PROCEDURE — 96375 TX/PRO/DX INJ NEW DRUG ADDON: CPT

## 2022-06-05 PROCEDURE — 83735 ASSAY OF MAGNESIUM: CPT

## 2022-06-05 PROCEDURE — 80053 COMPREHEN METABOLIC PANEL: CPT

## 2022-06-05 PROCEDURE — 36415 COLL VENOUS BLD VENIPUNCTURE: CPT

## 2022-06-05 RX ORDER — SUCRALFATE 1 G
1 TABLET ORAL ONCE
Refills: 0 | Status: COMPLETED | OUTPATIENT
Start: 2022-06-05 | End: 2022-06-05

## 2022-06-05 RX ORDER — HALOPERIDOL DECANOATE 100 MG/ML
2.5 INJECTION INTRAMUSCULAR ONCE
Refills: 0 | Status: COMPLETED | OUTPATIENT
Start: 2022-06-05 | End: 2022-06-05

## 2022-06-05 RX ORDER — FAMOTIDINE 10 MG/ML
20 INJECTION INTRAVENOUS ONCE
Refills: 0 | Status: COMPLETED | OUTPATIENT
Start: 2022-06-05 | End: 2022-06-05

## 2022-06-05 RX ORDER — ONDANSETRON 8 MG/1
4 TABLET, FILM COATED ORAL ONCE
Refills: 0 | Status: COMPLETED | OUTPATIENT
Start: 2022-06-05 | End: 2022-06-05

## 2022-06-05 RX ORDER — SODIUM CHLORIDE 9 MG/ML
2000 INJECTION INTRAMUSCULAR; INTRAVENOUS; SUBCUTANEOUS ONCE
Refills: 0 | Status: COMPLETED | OUTPATIENT
Start: 2022-06-05 | End: 2022-06-05

## 2022-06-05 RX ADMIN — SODIUM CHLORIDE 2000 MILLILITER(S): 9 INJECTION INTRAMUSCULAR; INTRAVENOUS; SUBCUTANEOUS at 22:31

## 2022-06-05 RX ADMIN — FAMOTIDINE 20 MILLIGRAM(S): 10 INJECTION INTRAVENOUS at 22:32

## 2022-06-05 RX ADMIN — HALOPERIDOL DECANOATE 2.5 MILLIGRAM(S): 100 INJECTION INTRAMUSCULAR at 23:55

## 2022-06-05 RX ADMIN — ONDANSETRON 4 MILLIGRAM(S): 8 TABLET, FILM COATED ORAL at 22:32

## 2022-06-05 RX ADMIN — Medication 1 GRAM(S): at 23:55

## 2022-06-05 NOTE — ED STATDOCS - PROGRESS NOTE DETAILS
Nausea improved slightly but patient still complains of burning feeling in stomach.  Refused liquid carafate due to texture issue with new foods and new meds.  Tablet form ordered but unsure if pharmacy has this preparation.  Nurse to request med.  Utox + for THC.  Haldol also ordered 2.5 IV. Patient walking around, feeling much better.  Wants to go home now.

## 2022-06-05 NOTE — ED STATDOCS - CLINICAL SUMMARY MEDICAL DECISION MAKING FREE TEXT BOX
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

## 2022-06-05 NOTE — ED STATDOCS - PATIENT PORTAL LINK FT
You can access the FollowMyHealth Patient Portal offered by [facility] by registering at the following website: [email or web address]. By joining [employer]’s FollowMyHealth portal, you will also be able to view your health information using other applications (apps) compatible with our system.

## 2022-06-05 NOTE — ED STATDOCS - NSFOLLOWUPINSTRUCTIONS_ED_ALL_ED_FT
Continue dissolvable medication for nausea as needed.    Use carafate for gastritis.   call gastroenterologist within 1-2 days for appt.  Stop using marijuana.    Gastritis    WHAT YOU NEED TO KNOW:    Gastritis is inflammation or irritation of the lining of your stomach. Abdominal Organs         DISCHARGE INSTRUCTIONS:    Call 911 for any of the following:     You develop chest pain or shortness of breath.        Return to the emergency department if:     You vomit blood.      You have black or bloody bowel movements.      You have severe stomach or back pain.    Contact your healthcare provider if:     You have a fever.      You have new or worsening symptoms, even after treatment.      You have questions or concerns about your condition or care.    Medicines:     Medicines may be given to help treat a bacterial infection or decrease stomach acid.       Take your medicine as directed. Contact your healthcare provider if you think your medicine is not helping or if you have side effects. Tell him or her if you are allergic to any medicine. Keep a list of the medicines, vitamins, and herbs you take. Include the amounts, and when and why you take them. Bring the list or the pill bottles to follow-up visits. Carry your medicine list with you in case of an emergency.    Manage or prevent gastritis:     Do not smoke. Nicotine and other chemicals in cigarettes and cigars can make your symptoms worse and cause lung damage. Ask your healthcare provider for information if you currently smoke and need help to quit. E-cigarettes or smokeless tobacco still contain nicotine. Talk to your healthcare provider before you use these products.       Do not drink alcohol. Alcohol can prevent healing and make your gastritis worse. Talk to your healthcare provider if you need help to stop drinking.      Do not take NSAIDs or aspirin unless directed. These and similar medicines can cause irritation. If your healthcare provider says it is okay to take NSAIDs, take them with food.       Do not eat foods that cause irritation. Foods such as oranges and salsa can cause burning or pain. Eat a variety of healthy foods. Examples include fruits (not citrus), vegetables, low-fat dairy products, beans, whole-grain breads, and lean meats and fish. Try to eat small meals, and drink water with your meals. Do not eat for at least 3 hours before you go to bed.      Find ways to relax and decrease stress. Stress can increase stomach acid and make gastritis worse. Activities such as yoga, meditation, or listening to music can help you relax. Spend time with friends, or do things you enjoy.    Follow up with your healthcare provider as directed: You may need ongoing tests or treatment, or referral to a gastroenterologist. Write down your questions so you remember to ask them during your visits.

## 2022-06-05 NOTE — ED STATDOCS - OBJECTIVE STATEMENT
Pt. is a 21 year old male with a hx of gastritis, anxiety and eating disorder (texture induced sensitivity to new foods and meds) presents with nausea and vomiting today.  Patient was in the ED recently for the same and has been taking pantoprazole and zofran at home with minimal relief.  Patient states he is having a tough time keeping down anything . +marijuana use, last used 2 wks ago.  Has hx of endoscopy a year ago showing gastritis.

## 2022-06-05 NOTE — ED ADULT NURSE NOTE - OBJECTIVE STATEMENT
Pt presents to ED for lower abd pain with nausea/vomiting for the past week. Pt denies fever/chills, sick contacts, diarrhea, dizziness. Pt has hx of gastritis.

## 2022-06-05 NOTE — ED ADULT TRIAGE NOTE - CHIEF COMPLAINT QUOTE
sharp/dull lower abdominal pain with nausea and many episodes of vomiting over past week. denies diarrhea. history of gastritis.

## 2022-06-06 RX ORDER — SUCRALFATE 1 G
1 TABLET ORAL
Qty: 14 | Refills: 0
Start: 2022-06-06 | End: 2022-06-12

## 2023-12-27 PROBLEM — Z86.59 PERSONAL HISTORY OF OTHER MENTAL AND BEHAVIORAL DISORDERS: Chronic | Status: ACTIVE | Noted: 2022-06-04

## 2023-12-27 PROBLEM — F41.9 ANXIETY DISORDER, UNSPECIFIED: Chronic | Status: ACTIVE | Noted: 2022-06-04

## 2023-12-27 PROBLEM — Z78.9 OTHER SPECIFIED HEALTH STATUS: Chronic | Status: ACTIVE | Noted: 2019-11-24

## 2024-01-25 PROBLEM — Z00.00 ENCOUNTER FOR PREVENTIVE HEALTH EXAMINATION: Status: ACTIVE | Noted: 2024-01-25

## 2024-01-26 ENCOUNTER — APPOINTMENT (OUTPATIENT)
Dept: INTERNAL MEDICINE | Facility: CLINIC | Age: 24
End: 2024-01-26

## 2025-01-28 NOTE — ED STATDOCS - PSYCHIATRIC [-], MLM
Goal Outcome Evaluation:                Outcome Evaluation: Pt alert, able to make needs known. On RA. On Enhanced Droplet Precautions. Incontinent of bowel and voiding per urinal. IV to LAC for ABX and IID, attempt for new IV x2 with out success. Medications given per orders, n/o for throat lozenge given with positive effect, for sore red throat. Safety maintained, call light within reach.                           
Goal Outcome Evaluation:            Outcome Evaluation: Pt A&Ox4, able to make needs known. Voiding per urinal, one episode of large incontinent urine with full bed change. Medications given per orders, IV to LAC for ABX and IID. On 1L NC and tele NSR. Safety maintained, call light within reach.                               
Goal Outcome Evaluation:           Progress: improving     VSS on RA. Weaned pt down from 1L at shift change. O2 sat 95-97% on RA. A/Ox4. Voiding per urinal, however incontinent at times due to frequency & urgency. MRSA swab returned negative. Lovenox. PRN medications for c/o pain, see MAR. Call light in reach, safety maintained.                            
Goal Outcome Evaluation:   Alert and oriented x4. O2 on at 1 l/m per nasal cannula. VSS. Safety maintained.                                         
Goal Outcome Evaluation:   Pt is A&Ox4, VSS on 1.5L NC d/t pt c/o SOB. PRN cough drops given with relief noted. Pt very [place], but pleasant. Voiding in urinal/BSC, but can be incontinent d/t frequency and urgency. Brief on. Tele-NSR. No c/o pain. Safety and enhanced droplet/contact precautions maintained. Call light within reach.                                      
Goal Outcome Evaluation:   Pt is A&Ox4, VSS on RA. Pt very [place], but pleasant. Voiding in urinal/BSC, but can be incontinent d/t frequency and urgency. Several BM's this shift, wearing brief. Tele-NSR. No c/o pain. Safety and enhanced droplet/contact precautions maintained. Call light within reach.                                          
Goal Outcome Evaluation:  Plan of Care Reviewed With: patient           Outcome Evaluation: Pt AxOx4 confused at times.  No complaints of pain.  Uses urinal, incontinent at times.  Up x 1 w/ walker.  Placed pt on RA w/ O2 sats above 90%.  Call light within reach.    Covid precautions in place.                            
Goal Outcome Evaluation:  Plan of Care Reviewed With: patient        Progress: improving        Pt a/o x4. Room air. Forgetful at times. [place]. No c/o pain at this time. Pt had a smear BM this morning. Up x1 with walker. Been up in chair today. Pt is to D/c to [facility]  today with family. PPP. VSS. ROWELL. Bed alarm set. Call light within reach. Safety maintained                         
Goal Outcome Evaluation:  Plan of Care Reviewed With: patient        Progress: improving  Outcome Evaluation: Bed mobility SBA. Sit to stand CGA with LOB forward requiring min A to correct. Pt amb 60' in room CGA-min A with turns every 10' . Pt educated on safety and POC. Pt sitting in chair with exit alarm on and nsg notified.                             
Goal Outcome Evaluation:  Plan of Care Reviewed With: patient        Progress: improving  Outcome Evaluation: PT tx completed. PT c/o abdominal pn. This was reported to RN. Sherlyn sup>sit, sit<>stand CG, amb ~ 50' in room utilizing r wx on 2L. Decreased endurance, cues for safety awareness, and encouragement to sit in chair.                             
Goal Outcome Evaluation:  Plan of Care Reviewed With: patient        Progress: improving  Outcome Evaluation: PT tx completed. Pt has no c/o. Sherlyn bed mobility, CG sit<>stand, amb several laps in room utilizing r wx CG/Sherlyn. Up in chair, encouraged to call for assistance and sitting on exit alarm.                             
Goal Outcome Evaluation:  Plan of Care Reviewed With: patient        Progress: improving  Outcome Evaluation: PT tx completed. Pt supine in bed, no c/o. Bed mobility S, amb short distance in room utilizing r wx CG n 2L. Decreased tolerance to activity. Recommend cont Pt for strengthening.                             
Goal Outcome Evaluation:  Plan of Care Reviewed With: patient        Progress: improving  Outcome Evaluation: Pt A/Ox4, forgetful at times. Enhanced droplet/contact precautions maintained. IV abx given as scheduled. No c/o pain. Safety maintained. Call light in reach.                             
Goal Outcome Evaluation:  Plan of Care Reviewed With: patient        Progress: improving  Outcome Evaluation: Pt A/Ox4, forgetful at times. VSS. RA. Pt slept throughout the night. C/o stomach pain in early AM, Tylenol given. Safety maintained. Call light in reach.                             
Goal Outcome Evaluation:  Plan of Care Reviewed With: patient        Progress: improving  Outcome Evaluation: Pt completed TB bathing task while seated on tub bench with set up and overall Min A. Mod A for LB dressing. Pt completed treatment on room air, did demonstrate increased WOB with TB bathing task. Pt would benefit from continued rehab at discharge.    Anticipated Discharge Disposition (OT): skilled nursing facility                        
Goal Outcome Evaluation:  Plan of Care Reviewed With: patient        Progress: improving  Outcome Evaluation: Pt completed tx on room air and O2 remained in mid to upper 90's. Pt min A with LB dressing and SBA for toileting task on BSC. Pt ambulated with RW with SBA. Pt would benefit from continued rehab. Continue OT POC    Anticipated Discharge Disposition (OT): skilled nursing facility                        
Goal Outcome Evaluation:  Plan of Care Reviewed With: patient        Progress: improving  Outcome Evaluation: The patient presents alert and oriented x4 lying in bed. He is hard of hearing and hears out of his L ear better. He lives at a SNF and his PLOF is difficulty to obtain. He is currently on RA. He was able to perform bed mobility, sit to stand, and ambulate around the room multiple times with use of RW with little to no assist. He was able to maintain his SpO2 at or above 90% on RA the entire time. He will benefit from continued PT to work on increasing his activity tolerance and recommend discharge back to SNF.    Anticipated Discharge Disposition (PT): skilled nursing facility                        
Goal Outcome Evaluation:  Plan of Care Reviewed With: patient        Progress: no change     Alert with periods of confusion.O2 on at 1 l/m per nasal cannula. VSS. Safety maintained.                           
Goal Outcome Evaluation:  Plan of Care Reviewed With: patient        Progress: no change     Pt A/OX4. Forgetful at times. VSS on 1L NC. Enhanced Contact precautions. Sepsis +. UA & Sputum Cx pending. Elevated lactate. C/o pain, Prn meds given. Voiding via urinal. Extremely [place]. Up x2. From [place] [facility]. Safety maintained. Call light in reach.                            
Goal Outcome Evaluation:  Plan of Care Reviewed With: patient        Progress: no change  Outcome Evaluation: OT eval complete.  Pt. is pleasant and [place].  Mr. Garcia is a res of SNF per the chart.  The pt is participatory and able to follow commands when he is able to hear.  He came to EOB at S and sat EOB with good balance.  CGA to stand to remove soiled brief at Max A.  Max A for toilet hygiene 2' to SOA and decreased act leonid.  Pt. attempted hgyiene but SOB increased with effort during standing task.  Min A to REJI new clean brief.  Mr. Garcia is quickly fatigued and would benefit from cont'd OT tx to improve his fxl capacity.  He is very pleasant and a good rehab candidate.  Anticipate DC back to SNF.    Anticipated Discharge Disposition (OT): skilled nursing facility                        
Goal Outcome Evaluation:  Plan of Care Reviewed With: patient        Progress: no change  Outcome Evaluation: Pt A/Ox4, forgetful at times. New IV in right forearm. IV abx given as scheduled. Pt had N/V and temp of 100.7 in early AM. Zofran and Tylenol given with good results. Temp re-check was 97.5. Safety maintained. Call light in reach.                             
not suicidal

## 2025-07-10 NOTE — ED ADULT TRIAGE NOTE - NS ED NURSE DIRECT TO ROOM YN
An attempt was made to contact the patient to schedule their follow up appointment with Dr. Juarez per a monthly Disposition Report.    Per the Follow-Up and Disposition Report, the patient is due for annual exam on/after 8/15/2025.  Scheduling and patient care is available 24 hours per day by calling [phone].   
Yes